# Patient Record
Sex: MALE | Race: BLACK OR AFRICAN AMERICAN | NOT HISPANIC OR LATINO | Employment: FULL TIME | URBAN - METROPOLITAN AREA
[De-identification: names, ages, dates, MRNs, and addresses within clinical notes are randomized per-mention and may not be internally consistent; named-entity substitution may affect disease eponyms.]

---

## 2017-09-23 ENCOUNTER — APPOINTMENT (EMERGENCY)
Dept: RADIOLOGY | Facility: HOSPITAL | Age: 22
End: 2017-09-23

## 2017-09-23 ENCOUNTER — HOSPITAL ENCOUNTER (EMERGENCY)
Facility: HOSPITAL | Age: 22
Discharge: HOME | End: 2017-09-23
Admitting: SURGERY

## 2017-09-23 VITALS
DIASTOLIC BLOOD PRESSURE: 69 MMHG | WEIGHT: 180 LBS | TEMPERATURE: 97.8 F | HEART RATE: 82 BPM | OXYGEN SATURATION: 99 % | RESPIRATION RATE: 18 BRPM | SYSTOLIC BLOOD PRESSURE: 126 MMHG

## 2017-09-23 LAB
BASE EXCESS BLDA CALC-SCNC: 2 MMOL/L (ref -2–3)
CA-I BLD-SCNC: 1.11 MMOL/L (ref 1.12–1.32)
ETHANOL SERPL-MCNC: 183 MG/DL (ref 0–3)
GLUCOSE SERPL-MCNC: 113 MG/DL (ref 65–140)
HCO3 BLDA-SCNC: 26.8 MMOL/L (ref 24–30)
HCT VFR BLD CALC: 46 % (ref 36.5–49.3)
HGB BLDA-MCNC: 15.6 G/DL (ref 12–17)
PCO2 BLD: 28 MMOL/L (ref 21–32)
PCO2 BLD: 40.8 MM HG (ref 42–50)
PH BLD: 7.43 [PH] (ref 7.3–7.4)
PO2 BLD: 79 MM HG (ref 35–45)
POTASSIUM BLD-SCNC: 2.7 MMOL/L (ref 3.5–5.3)
SAO2 % BLD FROM PO2: 96 % (ref 95–98)
SODIUM BLD-SCNC: 141 MMOL/L (ref 136–145)
SPECIMEN SOURCE: ABNORMAL

## 2017-09-23 PROCEDURE — 82330 ASSAY OF CALCIUM: CPT

## 2017-09-23 PROCEDURE — 96374 THER/PROPH/DIAG INJ IV PUSH: CPT

## 2017-09-23 PROCEDURE — 71010 HB CHEST X-RAY 1 VIEW FRONTAL: CPT

## 2017-09-23 PROCEDURE — 84132 ASSAY OF SERUM POTASSIUM: CPT

## 2017-09-23 PROCEDURE — 80320 DRUG SCREEN QUANTALCOHOLS: CPT | Performed by: EMERGENCY MEDICINE

## 2017-09-23 PROCEDURE — 84295 ASSAY OF SERUM SODIUM: CPT

## 2017-09-23 PROCEDURE — 82947 ASSAY GLUCOSE BLOOD QUANT: CPT

## 2017-09-23 PROCEDURE — 85014 HEMATOCRIT: CPT

## 2017-09-23 PROCEDURE — 99285 EMERGENCY DEPT VISIT HI MDM: CPT

## 2017-09-23 PROCEDURE — 74177 CT ABD & PELVIS W/CONTRAST: CPT

## 2017-09-23 PROCEDURE — 72125 CT NECK SPINE W/O DYE: CPT

## 2017-09-23 PROCEDURE — 70450 CT HEAD/BRAIN W/O DYE: CPT

## 2017-09-23 PROCEDURE — 36415 COLL VENOUS BLD VENIPUNCTURE: CPT | Performed by: EMERGENCY MEDICINE

## 2017-09-23 PROCEDURE — 82803 BLOOD GASES ANY COMBINATION: CPT

## 2017-09-23 RX ORDER — ONDANSETRON 2 MG/ML
4 INJECTION INTRAMUSCULAR; INTRAVENOUS ONCE
Status: COMPLETED | OUTPATIENT
Start: 2017-09-23 | End: 2017-09-23

## 2017-09-23 RX ORDER — ONDANSETRON 4 MG/1
4 TABLET, ORALLY DISINTEGRATING ORAL ONCE
Status: DISCONTINUED | OUTPATIENT
Start: 2017-09-23 | End: 2017-09-23

## 2017-09-23 RX ADMIN — IOHEXOL 100 ML: 350 INJECTION, SOLUTION INTRAVENOUS at 02:20

## 2017-09-23 RX ADMIN — ONDANSETRON 4 MG: 2 INJECTION INTRAMUSCULAR; INTRAVENOUS at 01:57

## 2020-05-11 ENCOUNTER — OFFICE VISIT (OUTPATIENT)
Dept: URGENT CARE | Facility: CLINIC | Age: 25
End: 2020-05-11
Payer: COMMERCIAL

## 2020-05-11 VITALS — HEIGHT: 68 IN | BODY MASS INDEX: 30.31 KG/M2 | WEIGHT: 200 LBS

## 2020-05-11 DIAGNOSIS — R05.9 COUGH: Primary | ICD-10-CM

## 2020-05-11 DIAGNOSIS — Z20.9 EXPOSURE TO COMMUNICABLE DISEASE: ICD-10-CM

## 2020-05-11 DIAGNOSIS — J06.9 VIRAL URI WITH COUGH: ICD-10-CM

## 2020-05-11 PROCEDURE — U0003 INFECTIOUS AGENT DETECTION BY NUCLEIC ACID (DNA OR RNA); SEVERE ACUTE RESPIRATORY SYNDROME CORONAVIRUS 2 (SARS-COV-2) (CORONAVIRUS DISEASE [COVID-19]), AMPLIFIED PROBE TECHNIQUE, MAKING USE OF HIGH THROUGHPUT TECHNOLOGIES AS DESCRIBED BY CMS-2020-01-R: HCPCS | Performed by: EMERGENCY MEDICINE

## 2020-05-11 PROCEDURE — 99203 OFFICE O/P NEW LOW 30 MIN: CPT | Performed by: EMERGENCY MEDICINE

## 2020-05-12 ENCOUNTER — TELEPHONE (OUTPATIENT)
Dept: URGENT CARE | Facility: CLINIC | Age: 25
End: 2020-05-12

## 2020-05-12 LAB — SARS-COV-2 RNA SPEC QL NAA+PROBE: NOT DETECTED

## 2020-05-22 ENCOUNTER — OFFICE VISIT (OUTPATIENT)
Dept: URGENT CARE | Facility: CLINIC | Age: 25
End: 2020-05-22
Payer: COMMERCIAL

## 2020-05-22 VITALS — HEART RATE: 80 BPM | OXYGEN SATURATION: 98 % | TEMPERATURE: 98 F

## 2020-05-22 DIAGNOSIS — J06.9 VIRAL URI: ICD-10-CM

## 2020-05-22 DIAGNOSIS — Z20.9 EXPOSURE TO COMMUNICABLE DISEASE: Primary | ICD-10-CM

## 2020-05-22 PROCEDURE — 99212 OFFICE O/P EST SF 10 MIN: CPT | Performed by: EMERGENCY MEDICINE

## 2020-05-22 PROCEDURE — U0003 INFECTIOUS AGENT DETECTION BY NUCLEIC ACID (DNA OR RNA); SEVERE ACUTE RESPIRATORY SYNDROME CORONAVIRUS 2 (SARS-COV-2) (CORONAVIRUS DISEASE [COVID-19]), AMPLIFIED PROBE TECHNIQUE, MAKING USE OF HIGH THROUGHPUT TECHNOLOGIES AS DESCRIBED BY CMS-2020-01-R: HCPCS | Performed by: EMERGENCY MEDICINE

## 2020-05-24 LAB — SARS-COV-2 RNA SPEC QL NAA+PROBE: NOT DETECTED

## 2020-07-19 ENCOUNTER — OFFICE VISIT (OUTPATIENT)
Dept: URGENT CARE | Facility: CLINIC | Age: 25
End: 2020-07-19
Payer: COMMERCIAL

## 2020-07-19 VITALS
RESPIRATION RATE: 18 BRPM | BODY MASS INDEX: 29.55 KG/M2 | OXYGEN SATURATION: 98 % | WEIGHT: 195 LBS | HEART RATE: 77 BPM | HEIGHT: 68 IN | TEMPERATURE: 99.9 F

## 2020-07-19 DIAGNOSIS — J06.9 VIRAL URI: Primary | ICD-10-CM

## 2020-07-19 LAB — S PYO AG THROAT QL: NEGATIVE

## 2020-07-19 PROCEDURE — 99213 OFFICE O/P EST LOW 20 MIN: CPT | Performed by: PHYSICIAN ASSISTANT

## 2020-07-19 PROCEDURE — 87070 CULTURE OTHR SPECIMN AEROBIC: CPT | Performed by: PHYSICIAN ASSISTANT

## 2020-07-19 PROCEDURE — 87880 STREP A ASSAY W/OPTIC: CPT | Performed by: PHYSICIAN ASSISTANT

## 2020-07-19 PROCEDURE — U0003 INFECTIOUS AGENT DETECTION BY NUCLEIC ACID (DNA OR RNA); SEVERE ACUTE RESPIRATORY SYNDROME CORONAVIRUS 2 (SARS-COV-2) (CORONAVIRUS DISEASE [COVID-19]), AMPLIFIED PROBE TECHNIQUE, MAKING USE OF HIGH THROUGHPUT TECHNOLOGIES AS DESCRIBED BY CMS-2020-01-R: HCPCS | Performed by: PHYSICIAN ASSISTANT

## 2020-07-19 RX ORDER — AMOXICILLIN 500 MG/1
500 CAPSULE ORAL EVERY 12 HOURS SCHEDULED
Qty: 14 CAPSULE | Refills: 0 | Status: SHIPPED | OUTPATIENT
Start: 2020-07-19 | End: 2020-07-26

## 2020-07-19 NOTE — PROGRESS NOTES
[facility]        NAME: Evette Litten is a 22 y o  male  : 1995    MRN: [id number]  DATE: 2020  TIME: 8:24 PM    Assessment and Plan   Viral URI [J06 9]  1  Viral URI  POCT rapid strepA    Throat culture    al mag oxide-diphenhydramine-lidocaine viscous (MAGIC MOUTHWASH) 1:1:1 suspension    amoxicillin (AMOXIL) 500 mg capsule    Novel Coronavirus (COVID-19), PCR [facility]    CANCELED: MISC COVID-19 TEST- Office Collection     Patient treated for strep pharyngitis even with negative rapid strep  High clinical suspicion due to subjective fevers, cervical lymph nodes, exudates, and lack of cough  Patient also has significant erythema and swelling of tonsils  Patient Instructions   Take antibiotic as prescribed  Complete full dose of antibiotics even if symptoms begin to improve or resolve  This is very contagious; do not share drinks or food with others  Replace your toothbrush in 1-2 days to prevent reinfection  Use OTC Tylenol for fever  Your symptoms should begin to improve over the next couple days  Follow-up with your PCP in 3-5 days if symptoms worsen or do not improve  Go to ER if symptoms become severe  Covid 19 results will return in a 7-10 days  We will call you with both negative or positive results  Prophylactically self quarantine  Department of health's newest recommendations state patient should self quarantine for 10 days since symptom onset or 3 days fever free without the use of fever reducing drugs (Tylenol and ibuprofen), whichever is longer AND overall improvement of symptoms  Drink lots of fluids to maintain hydration  Do not touch your face, wash hands often, and practice social distancing  Call your PCP if you have any questions or concerns  Go to ER if having severe symptoms such as chest pain, shortness of breath, or fever that is not responding to antipyretics  Follow up with PCP in 3-5 days  Proceed to  ER if symptoms worsen      Chief Complaint     Chief Complaint   Patient presents with    COVID-19     Sore throat, headache, fatigue, body aches started last night  No known exposure  No recent travel  History of Present Illness       Patient is a 62-year-old male with no significant past medical history presents to the office complaining of fatigue, headache, sore throat, general myalgias for 1 day  URI    This is a new problem  The current episode started today  The problem has been gradually worsening  Maximum temperature: Subjective fever  Associated symptoms include headaches and a sore throat  Pertinent negatives include no abdominal pain, chest pain, congestion, coughing, diarrhea, nausea, rash, rhinorrhea, sneezing or vomiting  He has tried acetaminophen and NSAIDs for the symptoms  The treatment provided mild relief  Review of Systems   Review of Systems   Constitutional: Positive for fatigue and fever (Subjective)  Negative for chills  HENT: Positive for sore throat  Negative for congestion, rhinorrhea and sneezing  Respiratory: Negative for cough and shortness of breath  Cardiovascular: Negative for chest pain and palpitations  Gastrointestinal: Negative for abdominal pain, diarrhea, nausea and vomiting  Musculoskeletal: Positive for myalgias  Skin: Negative for rash  Neurological: Positive for headaches  Negative for dizziness and light-headedness           Current Medications       Current Outpatient Medications:     al mag oxide-diphenhydramine-lidocaine viscous (MAGIC MOUTHWASH) 1:1:1 suspension, Swish and spit 10 mL every 4 (four) hours as needed for mouth pain or discomfort, Disp: 90 mL, Rfl: 0    amoxicillin (AMOXIL) 500 mg capsule, Take 1 capsule (500 mg total) by mouth every 12 (twelve) hours for 7 days, Disp: 14 capsule, Rfl: 0    Current Allergies     Allergies as of 07/19/2020    (No Known Allergies)            The following portions of the patient's history were reviewed and updated as appropriate: allergies, current medications, past family history, past medical history, past social history, past surgical history and problem list      Past Medical History:   Diagnosis Date    Known health problems: none        Past Surgical History:   Procedure Laterality Date    NO PAST SURGERIES         Family History   Problem Relation Age of Onset    No Known Problems Mother     Heart disease Father          Medications have been verified  Objective   Pulse 77   Temp 99 9 °F (37 7 °C)   Resp 18   Ht 5' 8" (1 727 m)   Wt 88 5 kg (195 lb)   SpO2 98%   BMI 29 65 kg/m²        Physical Exam     Physical Exam   Constitutional: He appears well-developed and well-nourished  HENT:   Head: Normocephalic and atraumatic  Right Ear: External ear normal    Left Ear: External ear normal    Nose: Nose normal    Mouth/Throat: Uvula is midline and mucous membranes are normal  Posterior oropharyngeal edema and posterior oropharyngeal erythema present  No tonsillar abscesses  Tonsils are 3+ on the right  Tonsils are 3+ on the left  Tonsillar exudate  Eyes: Lids are normal    Cardiovascular: Normal rate, regular rhythm and normal heart sounds  Exam reveals no gallop and no friction rub  No murmur heard  Pulmonary/Chest: Effort normal and breath sounds normal  No stridor  He has no wheezes  He has no rales  Musculoskeletal: Normal range of motion  Lymphadenopathy:     He has cervical adenopathy  Neurological: He is alert  Skin: Skin is warm and dry  Capillary refill takes less than 2 seconds  No rash noted  Psychiatric: He has a normal mood and affect  Nursing note and vitals reviewed  Rapid strep negative

## 2020-07-19 NOTE — LETTER
July 19, 2020     Patient: Clari Loyas Card   YOB: 1995   Date of Visit: 7/19/2020       To Whom It May Concern: It is my medical opinion that Nanci Bo Should remain out of work for 10 days since symptom onset or 3 days fever free without the use of fever reducing drugs, whichever is longer AND overall general improvement in symptoms OR negative results  If you have any questions or concerns, please don't hesitate to call           Sincerely,        Beatriz Horan PA-C    CC: No Recipients

## 2020-07-19 NOTE — PATIENT INSTRUCTIONS
Covid 19 results will return in a 7-10 days  We will call you with both negative or positive results  Prophylactically self quarantine  Department of health's newest recommendations state patient should self quarantine for 10 days since symptom onset or 3 days fever free without the use of fever reducing drugs (Tylenol and ibuprofen), whichever is longer AND overall improvement of symptoms  Drink lots of fluids to maintain hydration  Do not touch your face, wash hands often, and practice social distancing  Call your PCP if you have any questions or concerns  Go to ER if having severe symptoms such as chest pain, shortness of breath, or fever that is not responding to antipyretics

## 2020-07-20 LAB — SARS-COV-2 RNA SPEC QL NAA+PROBE: NOT DETECTED

## 2020-07-21 ENCOUNTER — TELEPHONE (OUTPATIENT)
Dept: URGENT CARE | Facility: CLINIC | Age: 25
End: 2020-07-21

## 2020-07-21 LAB — BACTERIA THROAT CULT: NORMAL

## 2020-07-30 ENCOUNTER — OFFICE VISIT (OUTPATIENT)
Dept: URGENT CARE | Facility: CLINIC | Age: 25
End: 2020-07-30
Payer: COMMERCIAL

## 2020-07-30 VITALS
HEART RATE: 71 BPM | RESPIRATION RATE: 18 BRPM | OXYGEN SATURATION: 98 % | TEMPERATURE: 98.3 F | HEIGHT: 68 IN | WEIGHT: 195 LBS | BODY MASS INDEX: 29.55 KG/M2

## 2020-07-30 DIAGNOSIS — Z11.59 SCREENING FOR VIRAL DISEASE: ICD-10-CM

## 2020-07-30 DIAGNOSIS — J30.2 SEASONAL ALLERGIES: Primary | ICD-10-CM

## 2020-07-30 PROCEDURE — 99213 OFFICE O/P EST LOW 20 MIN: CPT | Performed by: PHYSICIAN ASSISTANT

## 2020-07-30 PROCEDURE — U0003 INFECTIOUS AGENT DETECTION BY NUCLEIC ACID (DNA OR RNA); SEVERE ACUTE RESPIRATORY SYNDROME CORONAVIRUS 2 (SARS-COV-2) (CORONAVIRUS DISEASE [COVID-19]), AMPLIFIED PROBE TECHNIQUE, MAKING USE OF HIGH THROUGHPUT TECHNOLOGIES AS DESCRIBED BY CMS-2020-01-R: HCPCS | Performed by: PHYSICIAN ASSISTANT

## 2020-07-30 NOTE — PROGRESS NOTES
[facility]        NAME: Nelly Scruggs is a 22 y o  male  : 1995    MRN: [id number]  DATE: 2020  TIME: 9:29 AM    Assessment and Plan   Seasonal allergies [J30 2]  1  Seasonal allergies     2  Screening for viral disease  Novel Coronavirus (COVID-19), PCR [facility] - Office Collection     [facility]  Patient Instructions   Use over-the-counter Zyrtec and Flonase  Covid 19 results will return in a 7-10 days  We will call you with both negative or positive results  Prophylactically self quarantine  Department of health's newest recommendations state patient should self quarantine for 10 days since symptom onset or 3 days fever free without the use of fever reducing drugs (Tylenol and ibuprofen), whichever is longer AND overall improvement of symptoms  Drink lots of fluids to maintain hydration  Do not touch your face, wash hands often, and practice social distancing  Call your PCP if you have any questions or concerns  Go to ER if having severe symptoms such as chest pain, shortness of breath, or fever that is not responding to antipyretics  Follow up with PCP in 3-5 days  Proceed to  ER if symptoms worsen  Chief Complaint     Chief Complaint   Patient presents with    Cough     Seen here on 20, still has sore throat in am  His employer wants another test before returning         History of Present Illness       Patient is a 20-year-old male with significant past medical history of smoking presents to the office for repeat COVID-19 testing per employer request   Patient was seen on  and tested negative for coronavirus  He was also swabbed for strep throat which was negative also but treated with antibiotics due to clinical suspicion  He continues to have mild cough and sore throat but only in the morning     He denies fever, chills, SOB, CP, difficulty breathing, rhinorrhea, congestion, tongue or throat swelling, dysphagia, anosmia, dysgeusia, or weakness  He denies any new exposures  He has not tried any over-the-counter medications  Chart review shows patient has had the symptoms for 3 months  Review of Systems   Review of Systems   Constitutional: Negative for chills and fever  HENT: Positive for sore throat (Morning only)  Negative for congestion  Respiratory: Positive for cough  Negative for shortness of breath  Cardiovascular: Negative for chest pain and palpitations  Gastrointestinal: Negative for abdominal pain, diarrhea, nausea and vomiting  Musculoskeletal: Negative for myalgias  Skin: Negative for rash  Neurological: Negative for dizziness, light-headedness and headaches  Current Medications       Current Outpatient Medications:     al mag oxide-diphenhydramine-lidocaine viscous (MAGIC MOUTHWASH) 1:1:1 suspension, Swish and spit 10 mL every 4 (four) hours as needed for mouth pain or discomfort, Disp: 90 mL, Rfl: 0    Current Allergies     Allergies as of 07/30/2020    (No Known Allergies)            The following portions of the patient's history were reviewed and updated as appropriate: allergies, current medications, past family history, past medical history, past social history, past surgical history and problem list      Past Medical History:   Diagnosis Date    Known health problems: none        Past Surgical History:   Procedure Laterality Date    NO PAST SURGERIES         Family History   Problem Relation Age of Onset    No Known Problems Mother     Heart disease Father          Medications have been verified  Objective   Pulse 71   Temp 98 3 °F (36 8 °C)   Resp 18   Ht 5' 8" (1 727 m)   Wt 88 5 kg (195 lb)   SpO2 98%   BMI 29 65 kg/m²        Physical Exam     Physical Exam   Constitutional: He appears well-developed and well-nourished  HENT:   Head: Normocephalic and atraumatic     Right Ear: External ear normal    Left Ear: External ear normal    Nose: Nose normal  Mouth/Throat: Uvula is midline and mucous membranes are normal  No uvula swelling  Posterior oropharyngeal erythema present  No posterior oropharyngeal edema or tonsillar abscesses  Tonsils are 1+ on the right  Tonsils are 1+ on the left  No tonsillar exudate  Eyes: Lids are normal    Cardiovascular: Normal rate, regular rhythm and normal heart sounds  Exam reveals no gallop and no friction rub  No murmur heard  Pulmonary/Chest: Effort normal and breath sounds normal  No stridor  He has no wheezes  He has no rales  Musculoskeletal: Normal range of motion  Neurological: He is alert  Skin: Skin is warm and dry  Capillary refill takes less than 2 seconds  No rash noted  Psychiatric: He has a normal mood and affect  Nursing note and vitals reviewed

## 2020-07-30 NOTE — LETTER
July 30, 2020     Patient: Clari Gale Card   YOB: 1995   Date of Visit: 7/30/2020       To Whom It May Concern: It is my medical opinion that Nanci Bo Should remain out of work for 10 days since symptom onset or 3 days fever free without the use of fever reducing drugs, whichever is longer AND overall general improvement in symptoms OR negative results       If you have any questions or concerns, please don't hesitate to call           Sincerely,        Beatriz Horan PA-C    CC: No Recipients

## 2020-07-31 LAB — SARS-COV-2 RNA SPEC QL NAA+PROBE: NOT DETECTED
